# Patient Record
(demographics unavailable — no encounter records)

---

## 2025-05-13 NOTE — HEALTH RISK ASSESSMENT
[Yes] : Yes [Monthly or less (1 pt)] : Monthly or less (1 point) [1 or 2 (0 pts)] : 1 or 2 (0 points) [Never (0 pts)] : Never (0 points) [No] : In the past 12 months have you used drugs other than those required for medical reasons? No [No falls in past year] : Patient reported no falls in the past year [0] : 2) Feeling down, depressed, or hopeless: Not at all (0) [PHQ-2 Negative - No further assessment needed] : PHQ-2 Negative - No further assessment needed [de-identified] : none [de-identified] : none [Audit-CScore] : 1 [de-identified] : works as , enjoys dancing in spare time, walks  [de-identified] : chicken, deli meats, occasional salad, drinks coffee without creamer or sugar, gatorade or snapple throughout the day, does not drink water [[id number]] : 0 [Never] : Never

## 2025-05-13 NOTE — HEALTH RISK ASSESSMENT
[Yes] : Yes [Monthly or less (1 pt)] : Monthly or less (1 point) [1 or 2 (0 pts)] : 1 or 2 (0 points) [Never (0 pts)] : Never (0 points) [No] : In the past 12 months have you used drugs other than those required for medical reasons? No [No falls in past year] : Patient reported no falls in the past year [0] : 2) Feeling down, depressed, or hopeless: Not at all (0) [PHQ-2 Negative - No further assessment needed] : PHQ-2 Negative - No further assessment needed [de-identified] : none [de-identified] : none [Audit-CScore] : 1 [de-identified] : works as , enjoys dancing in spare time, walks  [de-identified] : chicken, deli meats, occasional salad, drinks coffee without creamer or sugar, gatorade or snapple throughout the day, does not drink water [[id number]] : 0 [Never] : Never

## 2025-05-13 NOTE — INTERPRETER SERVICES
[Time Spent: ____ minutes] : Total time spent using  services: [unfilled] minutes. The patient's primary language is not English thus required  services. [Interpreters_IDNumber] : [id number]  [Interpreters_FullName] : Tamiko [TWNoteComboBox1] : [place]

## 2025-05-13 NOTE — HISTORY OF PRESENT ILLNESS
[FreeTextEntry1] : CPE  [de-identified] : 47M [facility] arriving to clinic for CPE. Feeling well overall, no acute complaints.  He is working as a , and is very busy with his wok schedule, so he eats at the deli when he has time - chicken, deli meats, occasional salad, drinks coffee without creamer or sugar, gatorade or snapple throughout the day, does not drink water. Today weighs 196 lb. Does also eat sweets, and does not have time to exercise. Endorses mood being lower than usual sometimes, feels a lot of stress attributes to work routine. PHQ-2 negative. Feels well supported by family/friends. no tobacco/alcohol/illicit drug use   #Insomnia  - Started melatonin PRN (comes home late and eats late)   #HCM  - Colon - due, amenable to colonoscopy  - Tdap due, amenable and administered today  - dental referral  - Lipid panel- Cholesterol 322, , . Hgb 17.4, A1C 5.9%  Send: CBC, CMP, lipid panel, a1C, COLON/FIT referral or test  Counseling regarding nocturnal eating  day weights

## 2025-05-13 NOTE — INTERPRETER SERVICES
[Time Spent: ____ minutes] : Total time spent using  services: [unfilled] minutes. The patient's primary language is not English thus required  services. [Interpreters_IDNumber] : [id number]  [Interpreters_FullName] : Tamiko [TWNoteComboBox1] : [place]

## 2025-05-13 NOTE — COUNSELING
[Behavioral health counseling provided] : Behavioral health counseling provided [Sleep ___ hours/day] : Sleep [unfilled] hours/day [Engage in a relaxing activity] : Engage in a relaxing activity [Plan in advance] : Plan in advance [Potential consequences of obesity discussed] : Potential consequences of obesity discussed [Benefits of weight loss discussed] : Benefits of weight loss discussed [Encouraged to maintain food diary] : Encouraged to maintain food diary [Encouraged to increase physical activity] : Encouraged to increase physical activity [Target Wt Loss Goal ___] : Weight Loss Goals: Target weight loss goal [unfilled] lbs [Weigh Self Weekly] : weigh self weekly [Decrease Portions] : decrease portions [____ min/wk Activity] : [unfilled] min/wk activity [Keep Food Diary] : keep food diary [FreeTextEntry4] : 20 [None] : None [Good understanding] : Patient has a good understanding of lifestyle changes and steps needed to achieve self management goal

## 2025-05-13 NOTE — HISTORY OF PRESENT ILLNESS
[FreeTextEntry1] : CPE  [de-identified] : 47M [facility] arriving to clinic for CPE. Feeling well overall, no acute complaints.  He is working as a , and is very busy with his wok schedule, so he eats at the deli when he has time - chicken, deli meats, occasional salad, drinks coffee without creamer or sugar, gatorade or snapple throughout the day, does not drink water. Today weighs 196 lb. Does also eat sweets, and does not have time to exercise. Endorses mood being lower than usual sometimes, feels a lot of stress attributes to work routine. PHQ-2 negative. Feels well supported by family/friends. no tobacco/alcohol/illicit drug use   #Insomnia  - Started melatonin PRN (comes home late and eats late)   #HCM  - Colon - due, amenable to colonoscopy  - Tdap due, amenable and administered today  - dental referral  - Lipid panel- Cholesterol 322, , . Hgb 17.4, A1C 5.9%  Send: CBC, CMP, lipid panel, a1C, COLON/FIT referral or test  Counseling regarding nocturnal eating  day weights

## 2025-05-13 NOTE — PLAN
[FreeTextEntry1] : #HLD - Discussed lifestyle interventions at length and barriers to care including demands of work, employment as a  and lack of access to healthy dining options. Established care with EVELYN Desai, provided handouts with suggested portion management and diet recommendations - Counseled regarding nocturnal eating  - Encouraged reduction of sugary beverages and increased water intake - Weight gain 20 lbs since prior visit will discuss weight management further in next visit   #Insomnia - Resolved, continues to take Melatonin PRN  #HCM - TdAP today otherwise UTD - Colon referral sent  - Labs: lipid panel, A1C, CBC, CMP  - Dental referral sent